# Patient Record
Sex: FEMALE | Race: WHITE | NOT HISPANIC OR LATINO | URBAN - METROPOLITAN AREA
[De-identification: names, ages, dates, MRNs, and addresses within clinical notes are randomized per-mention and may not be internally consistent; named-entity substitution may affect disease eponyms.]

---

## 2023-04-12 ENCOUNTER — APPOINTMENT (RX ONLY)
Dept: URBAN - METROPOLITAN AREA CLINIC 103 | Facility: CLINIC | Age: 68
Setting detail: DERMATOLOGY
End: 2023-04-12

## 2023-04-12 DIAGNOSIS — I83.9 ASYMPTOMATIC VARICOSE VEINS OF LOWER EXTREMITIES: ICD-10-CM

## 2023-04-12 DIAGNOSIS — D18.0 HEMANGIOMA: ICD-10-CM

## 2023-04-12 DIAGNOSIS — L20.89 OTHER ATOPIC DERMATITIS: ICD-10-CM

## 2023-04-12 DIAGNOSIS — L85.3 XEROSIS CUTIS: ICD-10-CM

## 2023-04-12 DIAGNOSIS — L81.4 OTHER MELANIN HYPERPIGMENTATION: ICD-10-CM

## 2023-04-12 PROBLEM — L20.84 INTRINSIC (ALLERGIC) ECZEMA: Status: ACTIVE | Noted: 2023-04-12

## 2023-04-12 PROBLEM — D18.01 HEMANGIOMA OF SKIN AND SUBCUTANEOUS TISSUE: Status: ACTIVE | Noted: 2023-04-12

## 2023-04-12 PROBLEM — I83.93 ASYMPTOMATIC VARICOSE VEINS OF BILATERAL LOWER EXTREMITIES: Status: ACTIVE | Noted: 2023-04-12

## 2023-04-12 PROCEDURE — ? TREATMENT REGIMEN

## 2023-04-12 PROCEDURE — ? COUNSELING

## 2023-04-12 PROCEDURE — 99213 OFFICE O/P EST LOW 20 MIN: CPT

## 2023-04-12 ASSESSMENT — LOCATION DETAILED DESCRIPTION DERM
LOCATION DETAILED: RIGHT LATERAL PLANTAR MIDFOOT
LOCATION DETAILED: LEFT LATERAL ABDOMEN
LOCATION DETAILED: 1ST WEB SPACE LEFT HAND
LOCATION DETAILED: RIGHT MID RADIAL DORSAL INDEX FINGER
LOCATION DETAILED: LEFT PROXIMAL DORSAL INDEX FINGER
LOCATION DETAILED: RIGHT DISTAL MEDIAL POSTERIOR UPPER ARM
LOCATION DETAILED: LEFT MEDIAL PLANTAR MIDFOOT
LOCATION DETAILED: RIGHT RADIAL DORSAL HAND
LOCATION DETAILED: LEFT MID DORSAL INDEX FINGER
LOCATION DETAILED: RIGHT DORSAL FOOT
LOCATION DETAILED: LEFT ANTERIOR DISTAL UPPER ARM
LOCATION DETAILED: RIGHT ANTERIOR PROXIMAL UPPER ARM
LOCATION DETAILED: RIGHT PROXIMAL DORSAL INDEX FINGER
LOCATION DETAILED: LEFT PROXIMAL POSTERIOR UPPER ARM
LOCATION DETAILED: PERIUMBILICAL SKIN
LOCATION DETAILED: RIGHT LATERAL SUPERIOR CHEST
LOCATION DETAILED: LEFT DORSAL FOOT
LOCATION DETAILED: LEFT LATERAL INFERIOR CHEST

## 2023-04-12 ASSESSMENT — LOCATION ZONE DERM
LOCATION ZONE: FINGER
LOCATION ZONE: HAND
LOCATION ZONE: TRUNK
LOCATION ZONE: ARM
LOCATION ZONE: FEET

## 2023-04-12 ASSESSMENT — LOCATION SIMPLE DESCRIPTION DERM
LOCATION SIMPLE: RIGHT FOOT
LOCATION SIMPLE: LEFT FOOT
LOCATION SIMPLE: RIGHT INDEX FINGER
LOCATION SIMPLE: RIGHT UPPER ARM
LOCATION SIMPLE: LEFT HAND
LOCATION SIMPLE: RIGHT HAND
LOCATION SIMPLE: RIGHT PLANTAR SURFACE
LOCATION SIMPLE: ABDOMEN
LOCATION SIMPLE: LEFT UPPER ARM
LOCATION SIMPLE: LEFT PLANTAR SURFACE
LOCATION SIMPLE: LEFT INDEX FINGER
LOCATION SIMPLE: CHEST

## 2023-04-12 NOTE — PROCEDURE: TREATMENT REGIMEN
Plan: White cotton gloves recommend
Detail Level: Zone
Samples Given: Vanicream samples
Continue Regimen: TAC for flare ups BID
Initiate Treatment: Aquaphor for maintenance QD

## 2023-05-24 ENCOUNTER — APPOINTMENT (RX ONLY)
Dept: URBAN - METROPOLITAN AREA CLINIC 103 | Facility: CLINIC | Age: 68
Setting detail: DERMATOLOGY
End: 2023-05-24

## 2023-05-24 DIAGNOSIS — I83.9 ASYMPTOMATIC VARICOSE VEINS OF LOWER EXTREMITIES: ICD-10-CM

## 2023-05-24 DIAGNOSIS — L85.3 XEROSIS CUTIS: ICD-10-CM

## 2023-05-24 DIAGNOSIS — L20.89 OTHER ATOPIC DERMATITIS: ICD-10-CM

## 2023-05-24 PROBLEM — L20.84 INTRINSIC (ALLERGIC) ECZEMA: Status: ACTIVE | Noted: 2023-05-24

## 2023-05-24 PROBLEM — I83.93 ASYMPTOMATIC VARICOSE VEINS OF BILATERAL LOWER EXTREMITIES: Status: ACTIVE | Noted: 2023-05-24

## 2023-05-24 PROCEDURE — ? COUNSELING

## 2023-05-24 PROCEDURE — 99213 OFFICE O/P EST LOW 20 MIN: CPT

## 2023-05-24 PROCEDURE — ? TREATMENT REGIMEN

## 2023-05-24 PROCEDURE — ? OTC TREATMENT REGIMEN

## 2023-05-24 ASSESSMENT — LOCATION DETAILED DESCRIPTION DERM
LOCATION DETAILED: RIGHT PROXIMAL DORSAL INDEX FINGER
LOCATION DETAILED: 1ST WEB SPACE LEFT HAND
LOCATION DETAILED: RIGHT DORSAL FOOT
LOCATION DETAILED: RIGHT MID RADIAL DORSAL INDEX FINGER
LOCATION DETAILED: LEFT LATERAL PLANTAR HEEL
LOCATION DETAILED: RIGHT RADIAL DORSAL HAND
LOCATION DETAILED: LEFT MID DORSAL INDEX FINGER
LOCATION DETAILED: RIGHT MEDIAL PLANTAR HEEL
LOCATION DETAILED: LEFT PROXIMAL DORSAL INDEX FINGER
LOCATION DETAILED: LEFT DORSAL FOOT

## 2023-05-24 ASSESSMENT — LOCATION SIMPLE DESCRIPTION DERM
LOCATION SIMPLE: RIGHT HAND
LOCATION SIMPLE: RIGHT INDEX FINGER
LOCATION SIMPLE: LEFT INDEX FINGER
LOCATION SIMPLE: LEFT HAND
LOCATION SIMPLE: LEFT FOOT
LOCATION SIMPLE: RIGHT PLANTAR SURFACE
LOCATION SIMPLE: RIGHT FOOT
LOCATION SIMPLE: LEFT PLANTAR SURFACE

## 2023-05-24 ASSESSMENT — LOCATION ZONE DERM
LOCATION ZONE: HAND
LOCATION ZONE: FINGER
LOCATION ZONE: FEET

## 2023-05-24 NOTE — PROCEDURE: OTC TREATMENT REGIMEN
Detail Level: Zone
Patient Specific Otc Recommendations (Will Not Stick From Patient To Patient): Recommends Eucerin rough and bumpy skin vs Amlactin

## 2023-05-24 NOTE — PROCEDURE: TREATMENT REGIMEN
Plan: White cotton gloves recommend
Detail Level: Zone
Samples Given: Vanicream samples
Continue Regimen: TAC for flare ups BID\\nAquaphor for maintenance QD

## 2024-04-29 ENCOUNTER — APPOINTMENT (RX ONLY)
Dept: URBAN - METROPOLITAN AREA CLINIC 103 | Facility: CLINIC | Age: 69
Setting detail: DERMATOLOGY
End: 2024-04-29

## 2024-04-29 DIAGNOSIS — L85.3 XEROSIS CUTIS: ICD-10-CM

## 2024-04-29 DIAGNOSIS — L20.89 OTHER ATOPIC DERMATITIS: ICD-10-CM | Status: RESOLVED

## 2024-04-29 DIAGNOSIS — L65.9 NONSCARRING HAIR LOSS, UNSPECIFIED: ICD-10-CM

## 2024-04-29 PROCEDURE — ? ADDITIONAL NOTES

## 2024-04-29 PROCEDURE — ? PRESCRIPTION

## 2024-04-29 PROCEDURE — ? OTC TREATMENT REGIMEN

## 2024-04-29 PROCEDURE — ? COUNSELING

## 2024-04-29 PROCEDURE — ? TREATMENT REGIMEN

## 2024-04-29 PROCEDURE — 99214 OFFICE O/P EST MOD 30 MIN: CPT

## 2024-04-29 RX ORDER — FLUOCINONIDE 0.5 MG/ML
SOLUTION TOPICAL
Qty: 60 | Refills: 3 | Status: ERX | COMMUNITY
Start: 2024-04-29

## 2024-04-29 RX ORDER — MINOXIDIL 5 G/100ML
SOLUTION TOPICAL
Qty: 120 | Refills: 3 | Status: ERX | COMMUNITY
Start: 2024-04-29

## 2024-04-29 RX ADMIN — FLUOCINONIDE: 0.5 SOLUTION TOPICAL at 00:00

## 2024-04-29 RX ADMIN — MINOXIDIL: 5 SOLUTION TOPICAL at 00:00

## 2024-04-29 ASSESSMENT — LOCATION DETAILED DESCRIPTION DERM
LOCATION DETAILED: 1ST WEB SPACE LEFT HAND
LOCATION DETAILED: LEFT PROXIMAL DORSAL INDEX FINGER
LOCATION DETAILED: RIGHT RADIAL DORSAL HAND
LOCATION DETAILED: RIGHT PROXIMAL DORSAL INDEX FINGER
LOCATION DETAILED: RIGHT MEDIAL PLANTAR HEEL
LOCATION DETAILED: LEFT MID DORSAL INDEX FINGER
LOCATION DETAILED: LEFT LATERAL PLANTAR HEEL
LOCATION DETAILED: RIGHT MEDIAL FRONTAL SCALP
LOCATION DETAILED: RIGHT CENTRAL PARIETAL SCALP
LOCATION DETAILED: LEFT CENTRAL PARIETAL SCALP
LOCATION DETAILED: RIGHT MID RADIAL DORSAL INDEX FINGER

## 2024-04-29 ASSESSMENT — LOCATION SIMPLE DESCRIPTION DERM
LOCATION SIMPLE: RIGHT INDEX FINGER
LOCATION SIMPLE: LEFT PLANTAR SURFACE
LOCATION SIMPLE: RIGHT SCALP
LOCATION SIMPLE: RIGHT HAND
LOCATION SIMPLE: LEFT HAND
LOCATION SIMPLE: LEFT INDEX FINGER
LOCATION SIMPLE: RIGHT PLANTAR SURFACE
LOCATION SIMPLE: SCALP

## 2024-04-29 ASSESSMENT — LOCATION ZONE DERM
LOCATION ZONE: HAND
LOCATION ZONE: FINGER
LOCATION ZONE: SCALP
LOCATION ZONE: FEET

## 2024-04-29 NOTE — PROCEDURE: ADDITIONAL NOTES
Additional Notes: Patient recommended to take a biotin supplement of at least 5 mg.\\nPatient recommended to take a Vitamin D3 supplement.
Detail Level: Simple
Render Risk Assessment In Note?: no

## 2024-05-13 RX ORDER — MINOXIDIL 5 G/100ML
SOLUTION TOPICAL
Qty: 120 | Refills: 3 | Status: ERX